# Patient Record
Sex: FEMALE | Race: BLACK OR AFRICAN AMERICAN | Employment: FULL TIME | URBAN - METROPOLITAN AREA
[De-identification: names, ages, dates, MRNs, and addresses within clinical notes are randomized per-mention and may not be internally consistent; named-entity substitution may affect disease eponyms.]

---

## 2018-03-20 ENCOUNTER — OFFICE VISIT (OUTPATIENT)
Dept: MIDWIFE SERVICES | Age: 33
End: 2018-03-20

## 2018-03-20 VITALS
HEIGHT: 61 IN | WEIGHT: 145 LBS | HEART RATE: 65 BPM | SYSTOLIC BLOOD PRESSURE: 97 MMHG | BODY MASS INDEX: 27.38 KG/M2 | DIASTOLIC BLOOD PRESSURE: 62 MMHG

## 2018-03-20 DIAGNOSIS — Z20.2 POSSIBLE EXPOSURE TO STD: ICD-10-CM

## 2018-03-20 DIAGNOSIS — Z12.4 SCREENING FOR CERVICAL CANCER: ICD-10-CM

## 2018-03-20 DIAGNOSIS — Z01.419 WELL WOMAN EXAM WITH ROUTINE GYNECOLOGICAL EXAM: Primary | ICD-10-CM

## 2018-03-20 NOTE — PROGRESS NOTES
Chief Complaint   Patient presents with    Well Woman     Visit Vitals    BP 97/62    Pulse 65    Ht 5' 1\" (1.549 m)    Wt 145 lb (65.8 kg)    LMP 03/05/2018 (Exact Date)    BMI 27.4 kg/m2     1. Have you been to the ER, urgent care clinic since your last visit? Hospitalized since your last visit? No    2. Have you seen or consulted any other health care providers outside of the [address] [place] [address] since your last visit? Include any pap smears or colon screening. No     Patient is here today for a well woman exam.  Her last pap was 6/2015 and it was ASCUS with -HPV. She has complaints of nothing today.      verbal order for pap and nuswab for std's per concetta webster cnm

## 2018-03-20 NOTE — MR AVS SNAPSHOT
[address] [address] 1007 [facility] 
[phone] Patient: Linh Pinedo MRN: [id number] VCP:[id number] Visit Information Date & Time Provider Department Dept. Phone Encounter #  
 3/20/2018  1:30 PM [facility] [address] [id number] Your Appointments 4/11/2018 10:30 AM  
PHYSICAL with Primus Simon, [address] [place] [address],[address]) Appt Note: CPE//Yearly Physical w/fasting labs [address] [id number]  
[address] 235 [facility]  [address] Upcoming Health Maintenance Date Due Influenza Age 5 to Adult 8/1/2017 PAP AKA CERVICAL CYTOLOGY 6/3/2018 Allergies as of 3/20/2018  Review Complete On: 3/20/2018 By: Jevon Rothman RN No Known Allergies Current Immunizations  Reviewed on 4/17/2015 No immunizations on file. Not reviewed this visit You Were Diagnosed With   
  
 Codes Comments Screening for cervical cancer    -  Primary ICD-10-CM: Z12.4 ICD-9-CM: V76.2 Exposure to STD     ICD-10-CM: Z20.2 ICD-9-CM: V01.6 Vitals BP Pulse Height(growth percentile) Weight(growth percentile) LMP BMI  
 97/62 65 5' 1\" (1.549 m) 145 lb (65.8 kg) 03/05/2018 (Exact Date) 27.4 kg/m2 OB Status Smoking Status Having regular periods Never Smoker BMI and BSA Data Body Mass Index Body Surface Area  
 27.4 kg/m 2 1.68 m 2 Preferred Pharmacy Pharmacy Name Phone [id number] [phone] Your Updated Medication List  
  
   
This list is accurate as of 3/20/18  2:58 PM.  Always use your most recent med list.  
  
  
  
  
 ALPHA LIPOIC ACID PO Take  by mouth.  
  
 b complex vitamins tablet Take 1 Tab by mouth daily. BIOTIN PO Take  by mouth.  
  
 calcium-magnesium-zinc Tab Take  by mouth. FISH OIL 1,000 mg Cap Generic drug:  omega-3 fatty acids-vitamin e Take 1 Cap by mouth. HAIR,SKIN & NAILS PO Take  by mouth. ibuprofen 800 mg tablet Commonly known as:  MOTRIN Take 1 Tab by mouth every eight (8) hours. VITAMIN C PO Take  by mouth. VITAMIN D3 2,000 unit Tab Generic drug:  cholecalciferol (vitamin D3) Take  by mouth. We Performed the Following [address] [id number] Custom] PAP (IMAGE GUIDED) + HPV HIGH RISK [[id number] Custom] Patient Instructions Thank you for choosing [facility]. We know you have many options when it comes to your healthcare; we appreciate that you picked us. Our goal is to provide exceptional service and world class care for every patient. You may receive a survey in the mail or by email asking for your feedback. Please take a few minutes to share your thoughts about your recent visit. Your comments helps us understand what we do well and what we can do better. To ensure confidentiality, this survey is administered by an independent third-party, [employer] [place] participation will help us to continue and improve the quality of care that we provide to you, your family, friends, and neighbors. Thank you! Well Visit, Ages 25 to 48: Care Instructions Your Care Instructions Physical exams can help you stay healthy. Your doctor has checked your overall health and may have suggested ways to take good care of yourself. He or she also may have recommended tests. At home, you can help prevent illness with healthy eating, regular exercise, and other steps. Follow-up care is a key part of your treatment and safety.  Be sure to make and go to all appointments, and call your doctor if you are having problems. It's also a good idea to know your test results and keep a list of the medicines you take. How can you care for yourself at home? · Reach and stay at a healthy weight. This will lower your risk for many problems, such as obesity, diabetes, heart disease, and high blood pressure. · Get at least 30 minutes of physical activity on most days of the week. Walking is a good choice. You also may want to do other activities, such as running, swimming, cycling, or playing tennis or team sports. Discuss any changes in your exercise program with your doctor. · Do not smoke or allow others to smoke around you. If you need help quitting, talk to your doctor about stop-smoking programs and medicines. These can increase your chances of quitting for good. · Talk to your doctor about whether you have any risk factors for sexually transmitted infections (STIs). Having one sex partner (who does not have STIs and does not have sex with anyone else) is a good way to avoid these infections. · Use birth control if you do not want to have children at this time. Talk with your doctor about the choices available and what might be best for you. · Protect your skin from too much sun. When you're outdoors from 10 a.m. to 4 p.m., stay in the shade or cover up with clothing and a hat with a wide brim. Wear sunglasses that block UV rays. Even when it's cloudy, put broad-spectrum sunscreen (SPF 30 or higher) on any exposed skin. · See a dentist one or two times a year for checkups and to have your teeth cleaned. · Wear a seat belt in the car. · Drink alcohol in moderation, if at all. That means no more than 2 drinks a day for men and 1 drink a day for women. Follow your doctor's advice about when to have certain tests. These tests can spot problems early. For everyone · Cholesterol. Have the fat (cholesterol) in your blood tested after age 21.  Your doctor will tell you how often to have this done based on your age, family history, or other things that can increase your risk for heart disease. · Blood pressure. Have your blood pressure checked during a routine doctor visit. Your doctor will tell you how often to check your blood pressure based on your age, your blood pressure results, and other factors. · Vision. Talk with your doctor about how often to have a glaucoma test. 
· Diabetes. Ask your doctor whether you should have tests for diabetes. · Colon cancer. Have a test for colon cancer at age 48. You may have one of several tests. If you are younger than 48, you may need a test earlier if you have any risk factors. Risk factors include whether you already had a precancerous polyp removed from your colon or whether your parent, brother, sister, or child has had colon cancer. For women · Breast exam and mammogram. Talk to your doctor about when you should have a clinical breast exam and a mammogram. Medical experts differ on whether and how often women under 50 should have these tests. Your doctor can help you decide what is right for you. · Pap test and pelvic exam. Begin Pap tests at age 24. A Pap test is the best way to find cervical cancer. The test often is part of a pelvic exam. Ask how often to have this test. 
· Tests for sexually transmitted infections (STIs). Ask whether you should have tests for STIs. You may be at risk if you have sex with more than one person, especially if your partners do not wear condoms. For men · Tests for sexually transmitted infections (STIs). Ask whether you should have tests for STIs. You may be at risk if you have sex with more than one person, especially if you do not wear a condom. · Testicular cancer exam. Ask your doctor whether you should check your testicles regularly. · Prostate exam. Talk to your doctor about whether you should have a blood test (called a PSA test) for prostate cancer.  Experts differ on whether and when men should have this test. Some experts suggest it if you are older than 39 and are -American or have a father or brother who got prostate cancer when he was younger than 72. When should you call for help? Watch closely for changes in your health, and be sure to contact your doctor if you have any problems or symptoms that concern you. Where can you learn more? Go to http://dylon-ambar.info/. Enter P072 in the search box to learn more about \"Well Visit, Ages 25 to 48: Care Instructions. \" Current as of: May 12, 2017 Content Version: 11.4 © 6011-2609 [employer]. Care instructions adapted under license by [employer] (which disclaims liability or warranty for this information). If you have questions about a medical condition or this instruction, always ask your healthcare professional. [address] any warranty or liability for your use of this information. Introducing [facility]! Dear Homer Colon: Thank you for requesting a [employer] account. Our records indicate that you already have an active [employer] account. You can access your account anytime at [email or web address] Did you know that you can access your hospital and ER discharge instructions at any time in [employer]? You can also review all of your test results from your hospital stay or ER visit. Additional Information If you have questions, please visit the Frequently Asked Questions section of the [employer] website at [email or web address] Remember, [employer] is NOT to be used for urgent needs. For medical emergencies, dial 911. Now available from your iPhone and Android! Please provide this summary of care documentation to your next provider. Your primary care clinician is listed as Antolin Young. If you have any questions after today's visit, please call [phone].

## 2018-03-20 NOTE — PATIENT INSTRUCTIONS
Thank you for choosing [address]. We know you have many options when it comes to your healthcare; we appreciate that you picked us. Our goal is to provide exceptional service and world class care for every patient. You may receive a survey in the mail or by email asking for your feedback. Please take a few minutes to share your thoughts about your recent visit. Your comments helps us understand what we do well and what we can do better. To ensure confidentiality, this survey is administered by an independent third-party, [employer] participation will help us to continue and improve the quality of care that we provide to you, your family, friends, and neighbors. Thank you! Well Visit, Ages 25 to 48: Care Instructions  Your Care Instructions    Physical exams can help you stay healthy. Your doctor has checked your overall health and may have suggested ways to take good care of yourself. He or she also may have recommended tests. At home, you can help prevent illness with healthy eating, regular exercise, and other steps. Follow-up care is a key part of your treatment and safety. Be sure to make and go to all appointments, and call your doctor if you are having problems. It's also a good idea to know your test results and keep a list of the medicines you take. How can you care for yourself at home? · Reach and stay at a healthy weight. This will lower your risk for many problems, such as obesity, diabetes, heart disease, and high blood pressure. · Get at least 30 minutes of physical activity on most days of the week. Walking is a good choice. You also may want to do other activities, such as running, swimming, cycling, or playing tennis or team sports. Discuss any changes in your exercise program with your doctor. · Do not smoke or allow others to smoke around you. If you need help quitting, talk to your doctor about stop-smoking programs and medicines. These can increase your chances of quitting for good. · Talk to your doctor about whether you have any risk factors for sexually transmitted infections (STIs). Having one sex partner (who does not have STIs and does not have sex with anyone else) is a good way to avoid these infections. · Use birth control if you do not want to have children at this time. Talk with your doctor about the choices available and what might be best for you. · Protect your skin from too much sun. When you're outdoors from 10 a.m. to 4 p.m., stay in the shade or cover up with clothing and a hat with a wide brim. Wear sunglasses that block UV rays. Even when it's cloudy, put broad-spectrum sunscreen (SPF 30 or higher) on any exposed skin. · See a dentist one or two times a year for checkups and to have your teeth cleaned. · Wear a seat belt in the car. · Drink alcohol in moderation, if at all. That means no more than 2 drinks a day for men and 1 drink a day for women. Follow your doctor's advice about when to have certain tests. These tests can spot problems early. For everyone  · Cholesterol. Have the fat (cholesterol) in your blood tested after age 21. Your doctor will tell you how often to have this done based on your age, family history, or other things that can increase your risk for heart disease. · Blood pressure. Have your blood pressure checked during a routine doctor visit. Your doctor will tell you how often to check your blood pressure based on your age, your blood pressure results, and other factors. · Vision. Talk with your doctor about how often to have a glaucoma test.  · Diabetes. Ask your doctor whether you should have tests for diabetes. · Colon cancer. Have a test for colon cancer at age 48. You may have one of several tests. If you are younger than 48, you may need a test earlier if you have any risk factors.  Risk factors include whether you already had a precancerous polyp removed from your colon or whether your parent, brother, sister, or child has had colon cancer. For women  · Breast exam and mammogram. Talk to your doctor about when you should have a clinical breast exam and a mammogram. Medical experts differ on whether and how often women under 50 should have these tests. Your doctor can help you decide what is right for you. · Pap test and pelvic exam. Begin Pap tests at age 24. A Pap test is the best way to find cervical cancer. The test often is part of a pelvic exam. Ask how often to have this test.  · Tests for sexually transmitted infections (STIs). Ask whether you should have tests for STIs. You may be at risk if you have sex with more than one person, especially if your partners do not wear condoms. For men  · Tests for sexually transmitted infections (STIs). Ask whether you should have tests for STIs. You may be at risk if you have sex with more than one person, especially if you do not wear a condom. · Testicular cancer exam. Ask your doctor whether you should check your testicles regularly. · Prostate exam. Talk to your doctor about whether you should have a blood test (called a PSA test) for prostate cancer. Experts differ on whether and when men should have this test. Some experts suggest it if you are older than 39 and are -American or have a father or brother who got prostate cancer when he was younger than 72. When should you call for help? Watch closely for changes in your health, and be sure to contact your doctor if you have any problems or symptoms that concern you. Where can you learn more? Go to http://dylon-ambar.info/. Enter P072 in the search box to learn more about \"Well Visit, Ages 25 to 48: Care Instructions. \"  Current as of: May 12, 2017  Content Version: 11.4  © 1210-5988 [employer]. Care instructions adapted under license by [employer] (which disclaims liability or warranty for this information).  If you have questions about a medical condition or this instruction, always ask your healthcare professional. [address] [address] any warranty or liability for your use of this information.

## 2018-03-20 NOTE — PROGRESS NOTES
Kamar New is a [place] ,  27 y.o. female who presents for her annual checkup. Patient's last menstrual period was 03/05/2018 (exact date). .  She is not currently sexually active, but would like to discuss birth control. She was  about a year ago, and has been in a couple of relationships since then. She is struggling with dating and often feels rejected. She was tearful through most of the appointment. She denies depression, and does not want treatment. She would like STD testing. She has an appointment with her PCP next month and would like to have any blood work done there. Last pelvic/PAP: 06/2015 ASCUS, neg HPV        Current Outpatient Prescriptions   Medication Sig Dispense Refill    ASCORBATE CALCIUM (VITAMIN C PO) Take  by mouth.  ALPHA LIPOIC ACID PO Take  by mouth.  BIOTIN PO Take  by mouth.  omega-3 fatty acids-vitamin e (FISH OIL) 1,000 mg cap Take 1 Cap by mouth.  MULTIVITAMIN WITH MINERALS (HAIR,SKIN & NAILS PO) Take  by mouth.  b complex vitamins tablet Take 1 Tab by mouth daily.  cholecalciferol, vitamin D3, (VITAMIN D3) 2,000 unit Tab Take  by mouth.  calcium-magnesium-zinc Tab Take  by mouth.  ibuprofen (MOTRIN) 800 mg tablet Take 1 Tab by mouth every eight (8) hours. 30 Tab 0     Allergies: Review of patient's allergies indicates no known allergies.    Social History     Social History    Marital status:      Spouse name: N/A    Number of children: 3    Years of education: N/A     Occupational History    human resources      Social History Main Topics    Smoking status: Never Smoker    Smokeless tobacco: Never Used    Alcohol use No    Drug use: No    Sexual activity: Yes     Partners: Male     Birth control/ protection: None     Other Topics Concern    Not on file     Social History Narrative     Family History   Problem Relation Age of Onset    Hypertension Mother    Delona Face Other Mother      Respiratory complications (on oxygen)    Elevated Lipids Father     Diabetes Father     No Known Problems Brother     Kidney Disease Maternal Grandmother     Cancer Maternal Grandfather      prostate    Dementia Paternal Grandmother     Stroke Paternal Grandfather     No Known Problems Brother      Patient Active Problem List   Diagnosis Code   (none) - all problems resolved or deleted       Review of Systems - History obtained from the patient  Constitutional: negative for weight loss, fever, night sweats  HEENT: negative for hearing loss, earache, congestion, snoring, sorethroat  CV: negative for chest pain, palpitations, edema  Resp: negative for cough, shortness of breath, wheezing  GI: negative for change in bowel habits, abdominal pain, black or bloody stools  : negative for frequency, dysuria, hematuria, vaginal discharge  MSK: negative for back pain, joint pain, muscle pain  Breast: negative for breast lumps, nipple discharge, galactorrhea  Skin:negative for itching, rash, hives  Neuro: negative for dizziness, headache, confusion, weakness  Psych: negative for anxiety, depression, change in mood  Heme/lymph: negative for bleeding, bruising, pallor    Physical Exam    Visit Vitals    BP 97/62    Pulse 65    Ht 5' 1\" (1.549 m)    Wt 145 lb (65.8 kg)    LMP 03/05/2018 (Exact Date)    BMI 27.4 kg/m2     General appearance: alert, cooperative, no distress, appears stated age  Neck: supple, symmetrical, trachea midline, no adenopathy, thyroid: not enlarged, symmetric, no tenderness/mass/nodules, no carotid bruit and no JVD  Back: symmetric, no curvature. ROM normal.   Lungs: clear to auscultation bilaterally, no wheezes, no increased work of breathing  Heart: regular rate and rhythm, S1, S2 normal, no murmur, click, rub or gallop  Abdomen: soft, non-tender.  Bowel sounds normal. No masses,  no organomegaly  Extremities: extremities normal, atraumatic, no cyanosis or edema  Skin: Skin color, texture, turgor normal. No rashes or lesions  Pelvic: ext genital nl without rashes or lesions, pink and moist vaginal mucosa, scant white discharge, cervix without lesions or abnormal discharge, uterus non tender and normal size, no adnexal masses or tenderness  Breast: non tender, no masses or tenderness, no nipple discharge      Assessment and Plan:    1.  Well woman - physical / health maintenance visit   Pap smear   Counseled re: diet, exercise, healthy lifestyle               Referred to [facility]               Nuswab   Return for yearly wellness visits

## 2018-03-26 LAB
A VAGINAE DNA VAG QL NAA+PROBE: NORMAL SCORE
BVAB2 DNA VAG QL NAA+PROBE: NORMAL SCORE
C ALBICANS DNA VAG QL NAA+PROBE: NEGATIVE
C GLABRATA DNA VAG QL NAA+PROBE: NEGATIVE
C TRACH RRNA SPEC QL NAA+PROBE: NEGATIVE
MEGA1 DNA VAG QL NAA+PROBE: NORMAL SCORE
N GONORRHOEA RRNA SPEC QL NAA+PROBE: NEGATIVE
T VAGINALIS RRNA SPEC QL NAA+PROBE: NEGATIVE

## 2018-03-27 LAB
CYTOLOGIST CVX/VAG CYTO: NORMAL
CYTOLOGY CVX/VAG DOC THIN PREP: NORMAL
DX ICD CODE: NORMAL
HPV I/H RISK 1 DNA CVX QL PROBE+SIG AMP: NORMAL
HPV I/H RISK 4 DNA CVX QL PROBE+SIG AMP: NEGATIVE
Lab: NORMAL
OTHER STN SPEC: NORMAL
PATH REPORT.FINAL DX SPEC: NORMAL
STAT OF ADQ CVX/VAG CYTO-IMP: NORMAL

## 2018-04-11 ENCOUNTER — OFFICE VISIT (OUTPATIENT)
Dept: INTERNAL MEDICINE CLINIC | Age: 33
End: 2018-04-11

## 2018-04-11 VITALS
DIASTOLIC BLOOD PRESSURE: 74 MMHG | BODY MASS INDEX: 27.75 KG/M2 | HEART RATE: 75 BPM | OXYGEN SATURATION: 98 % | TEMPERATURE: 98.1 F | HEIGHT: 61 IN | WEIGHT: 147 LBS | RESPIRATION RATE: 16 BRPM | SYSTOLIC BLOOD PRESSURE: 112 MMHG

## 2018-04-11 DIAGNOSIS — Z00.00 PHYSICAL EXAM, ANNUAL: Primary | ICD-10-CM

## 2018-04-11 NOTE — PROGRESS NOTES
HISTORY OF PRESENT ILLNESS  Fallon Andino is a 27 y.o. female. HPI   Last here 4/17/15. Pt is here for routine care. BP is 112/74    Wt is down 3 lbs x lov  She states that her home scale reads differently than ours  She exercises 5-6x weekly  She does not drink caloric beverage   Discussed decreasing caloric beverage and increasing water intake    Advised her to prepare healthy meals    Reviewed labs 4/15  Will get labs today     PREVENTIVE:  Colonoscopy: not yet needed, due at age 37  Pap: Dr. Maryuri Hebert, 3/18  Mammogram: not yet needed, due at age 34  Dexa: not yet needed, due at age 70  Tdap:   Pneumovax: not yet needed, due at age 70  Zostavax: not yet needed, due at age 70  Flu shot: declines  Eye exam: , due  Lipids: 4/15 LDL 87    There are no active problems to display for this patient. Current Outpatient Prescriptions   Medication Sig Dispense Refill    ASCORBATE CALCIUM (VITAMIN C PO) Take  by mouth.  ALPHA LIPOIC ACID PO Take  by mouth.  BIOTIN PO Take  by mouth.  omega-3 fatty acids-vitamin e (FISH OIL) 1,000 mg cap Take 1 Cap by mouth.  MULTIVITAMIN WITH MINERALS (HAIR,SKIN & NAILS PO) Take  by mouth.  b complex vitamins tablet Take 1 Tab by mouth daily.  ibuprofen (MOTRIN) 800 mg tablet Take 1 Tab by mouth every eight (8) hours. 30 Tab 0    cholecalciferol, vitamin D3, (VITAMIN D3) 2,000 unit Tab Take  by mouth.  calcium-magnesium-zinc Tab Take  by mouth.        Past Surgical History:   Procedure Laterality Date    HX  SECTION      HX GYN          HX OTHER SURGICAL      wisdom teeth extraction    HX OTHER SURGICAL      cyst removed from R hand      Lab Results  Component Value Date/Time   WBC 4.6 2015 03:15 PM   HGB 13.4 2015 03:15 PM   HCT 40.8 2015 03:15 PM   PLATELET 595  03:15 PM   MCV 93 2015 03:15 PM   Hgb, External 11.9 g/dL 2013   Hct, External 35.4 %  2013   Platelet cnt., External 184 x10E3/uL 05/11/2013     Lab Results  Component Value Date/Time   Cholesterol, total 192 04/17/2015 03:15 PM   HDL Cholesterol 92 04/17/2015 03:15 PM   LDL, calculated 87 04/17/2015 03:15 PM   Triglyceride 67 04/17/2015 03:15 PM     Lab Results  Component Value Date/Time   GFR est non-AA 97 04/17/2015 03:15 PM   GFR est  04/17/2015 03:15 PM   Creatinine 0.81 04/17/2015 03:15 PM   BUN 11 04/17/2015 03:15 PM   Sodium 143 04/17/2015 03:15 PM   Potassium 4.4 04/17/2015 03:15 PM   Chloride 100 04/17/2015 03:15 PM   CO2 27 04/17/2015 03:15 PM        Review of Systems   Respiratory: Negative for shortness of breath. Cardiovascular: Negative for chest pain. Physical Exam   Constitutional: She is oriented to person, place, and time. She appears well-developed and well-nourished. No distress. HENT:   Head: Normocephalic and atraumatic. Right Ear: External ear normal.   Left Ear: External ear normal.   Mouth/Throat: Oropharynx is clear and moist. No oropharyngeal exudate. Mild cerumen L TM   Eyes: Conjunctivae and EOM are normal. Right eye exhibits no discharge. Left eye exhibits no discharge. No scleral icterus. Neck: Normal range of motion. Neck supple. No carotid bruits    Cardiovascular: Normal rate, regular rhythm, normal heart sounds and intact distal pulses. Exam reveals no gallop and no friction rub. No murmur heard. Pulmonary/Chest: Effort normal and breath sounds normal. No respiratory distress. She has no wheezes. She has no rales. She exhibits no tenderness. Abdominal: Soft. She exhibits no distension and no mass. There is no tenderness. There is no rebound and no guarding. Musculoskeletal: Normal range of motion. She exhibits no edema, tenderness or deformity. Lymphadenopathy:     She has no cervical adenopathy. Neurological: She is alert and oriented to person, place, and time. Coordination normal.   Skin: Skin is warm and dry. No rash noted.  She is not diaphoretic. No erythema. No pallor. Psychiatric: She has a normal mood and affect. Her behavior is normal.       ASSESSMENT and PLAN    ICD-10-CM ICD-9-CM    1. Physical exam, annual    Discussed diet and w/l and exercise, nl BP, labs today, schedule eye exam, declines flu shot, Tdap and pelvic UTD   Z00.00 V70.0 LIPID PANEL      METABOLIC PANEL, COMPREHENSIVE      CBC W/O DIFF      TSH 3RD GENERATION      HEMOGLOBIN A1C WITH EAG      VITAMIN D, 25 HYDROXY        Depression screen reviewed and negative. Scribed by Scotty Dose of [address], as dictated by Dr. Maddy Landers. Current diagnosis and concerns discussed with pt at length. Pt understands risks and benefits or current treatment plan and medications, and accepts the treatment and medication with any possible risks. Pt asks appropriate questions, which were answered. Pt was instructed to call with any concerns or problems. This note will not be viewable in [address]

## 2018-04-11 NOTE — MR AVS SNAPSHOT
[address] [facility] [address] [facility] [address] [place] [address] 
[phone] Patient: Cathryn Rosa MRN: [id number] NJS:[id number] Visit Information Date & Time Provider Department Dept. Phone Encounter #  
 4/11/2018 10:30 AM Jacinta Hanson, Shun [address] [place] [address],[address] [phone] [id number] Follow-up Instructions Return in about 1 year (around 4/11/2019). Upcoming Health Maintenance Date Due DTaP/Tdap/Td series (1 - Tdap) 8/25/2004 PAP AKA CERVICAL CYTOLOGY 3/20/2021 Allergies as of 4/11/2018  Review Complete On: 4/11/2018 By: Jacinta Hanson MD  
 No Known Allergies Current Immunizations  Reviewed on 4/17/2015 Name Date Tdap 1/1/2013 Not reviewed this visit You Were Diagnosed With   
  
 Codes Comments Physical exam, annual    -  Primary ICD-10-CM: Z00.00 ICD-9-CM: V70.0 Vitals BP Pulse Temp Resp Height(growth percentile) Weight(growth percentile) 112/74 (BP 1 Location: Left arm, BP Patient Position: Sitting) 75 98.1 °F (36.7 °C) (Oral) 16 5' 1\" (1.549 m) 147 lb (66.7 kg) SpO2 BMI OB Status Smoking Status 98% 27.78 kg/m2 Having regular periods Never Smoker Vitals History BMI and BSA Data Body Mass Index Body Surface Area  
 27.78 kg/m 2 1.69 m 2 Preferred Pharmacy Pharmacy Name Phone [id number] [phone] Your Updated Medication List  
  
   
This list is accurate as of 4/11/18 11:00 AM.  Always use your most recent med list.  
  
  
  
  
 ALPHA LIPOIC ACID PO Take  by mouth.  
  
 b complex vitamins tablet Take 1 Tab by mouth daily. BIOTIN PO Take  by mouth.  
  
 calcium-magnesium-zinc Tab Take  by mouth. FISH OIL 1,000 mg Cap Generic drug:  omega-3 fatty acids-vitamin e Take 1 Cap by mouth.   
  
 HAIR,SKIN & NAILS PO  
 Take  by mouth. ibuprofen 800 mg tablet Commonly known as:  MOTRIN Take 1 Tab by mouth every eight (8) hours. VITAMIN C PO Take  by mouth. VITAMIN D3 2,000 unit Tab Generic drug:  cholecalciferol (vitamin D3) Take  by mouth. We Performed the Following CBC W/O DIFF [28938 CPT(R)] HEMOGLOBIN A1C WITH EAG [60161 CPT(R)] LIPID PANEL [89822 CPT(R)] METABOLIC PANEL, COMPREHENSIVE [47718 CPT(R)] TSH 3RD GENERATION [84774 CPT(R)] VITAMIN D, 25 HYDROXY M6751954 CPT(R)] Follow-up Instructions Return in about 1 year (around 4/11/2019). Introducing [facility] & HEALTH SERVICES! Dear Criss Hdez: Thank you for requesting a [employer] account. Our records indicate that you already have an active [employer] account. You can access your account anytime at [email or web address] Did you know that you can access your hospital and ER discharge instructions at any time in Oz Sonotek? You can also review all of your test results from your hospital stay or ER visit. Additional Information If you have questions, please visit the Frequently Asked Questions section of the [employer] website at [email or web address] Remember, [employer] Sonotek is NOT to be used for urgent needs. For medical emergencies, dial 911. Now available from your iPhone and Android! Please provide this summary of care documentation to your next provider. Your primary care clinician is listed as Olivia Alarcon. If you have any questions after today's visit, please call [phone].

## 2018-04-12 LAB
25(OH)D3+25(OH)D2 SERPL-MCNC: 31.9 NG/ML (ref 30–100)
ALBUMIN SERPL-MCNC: 4.3 G/DL (ref 3.5–5.5)
ALBUMIN/GLOB SERPL: 1.8 {RATIO} (ref 1.2–2.2)
ALP SERPL-CCNC: 40 IU/L (ref 39–117)
ALT SERPL-CCNC: 11 IU/L (ref 0–32)
AST SERPL-CCNC: 22 IU/L (ref 0–40)
BILIRUB SERPL-MCNC: 0.3 MG/DL (ref 0–1.2)
BUN SERPL-MCNC: 10 MG/DL (ref 6–20)
BUN/CREAT SERPL: 12 (ref 9–23)
CALCIUM SERPL-MCNC: 9.3 MG/DL (ref 8.7–10.2)
CHLORIDE SERPL-SCNC: 97 MMOL/L (ref 96–106)
CHOLEST SERPL-MCNC: 189 MG/DL (ref 100–199)
CO2 SERPL-SCNC: 26 MMOL/L (ref 18–29)
CREAT SERPL-MCNC: 0.83 MG/DL (ref 0.57–1)
ERYTHROCYTE [DISTWIDTH] IN BLOOD BY AUTOMATED COUNT: 15 % (ref 12.3–15.4)
EST. AVERAGE GLUCOSE BLD GHB EST-MCNC: 100 MG/DL
GFR SERPLBLD CREATININE-BSD FMLA CKD-EPI: 106 ML/MIN/1.73
GFR SERPLBLD CREATININE-BSD FMLA CKD-EPI: 92 ML/MIN/1.73
GLOBULIN SER CALC-MCNC: 2.4 G/DL (ref 1.5–4.5)
GLUCOSE SERPL-MCNC: 74 MG/DL (ref 65–99)
HBA1C MFR BLD: 5.1 % (ref 4.8–5.6)
HCT VFR BLD AUTO: 37.8 % (ref 34–46.6)
HDLC SERPL-MCNC: 91 MG/DL
HGB BLD-MCNC: 12.9 G/DL (ref 11.1–15.9)
LDLC SERPL CALC-MCNC: 88 MG/DL (ref 0–99)
MCH RBC QN AUTO: 32.1 PG (ref 26.6–33)
MCHC RBC AUTO-ENTMCNC: 34.1 G/DL (ref 31.5–35.7)
MCV RBC AUTO: 94 FL (ref 79–97)
PLATELET # BLD AUTO: 245 X10E3/UL (ref 150–379)
POTASSIUM SERPL-SCNC: 4.3 MMOL/L (ref 3.5–5.2)
PROT SERPL-MCNC: 6.7 G/DL (ref 6–8.5)
RBC # BLD AUTO: 4.02 X10E6/UL (ref 3.77–5.28)
SODIUM SERPL-SCNC: 137 MMOL/L (ref 134–144)
TRIGL SERPL-MCNC: 52 MG/DL (ref 0–149)
TSH SERPL DL<=0.005 MIU/L-ACNC: 1.34 UIU/ML (ref 0.45–4.5)
VLDLC SERPL CALC-MCNC: 10 MG/DL (ref 5–40)
WBC # BLD AUTO: 4.6 X10E3/UL (ref 3.4–10.8)

## 2018-06-12 ENCOUNTER — OFFICE VISIT (OUTPATIENT)
Dept: MIDWIFE SERVICES | Age: 33
End: 2018-06-12

## 2018-06-12 VITALS
WEIGHT: 153.5 LBS | HEART RATE: 74 BPM | SYSTOLIC BLOOD PRESSURE: 109 MMHG | DIASTOLIC BLOOD PRESSURE: 63 MMHG | BODY MASS INDEX: 28.98 KG/M2 | HEIGHT: 61 IN

## 2018-06-12 DIAGNOSIS — A74.9 CHLAMYDIA: Primary | ICD-10-CM

## 2018-06-12 NOTE — PROGRESS NOTES
Guillermo Mena presents wanting to discuss birth control. She has had a couple of new partners this year. She recently had a pregnancy scare and decided she wanted to start on birth control. May 20th she was seen at a clinic and treated for chlamydia. Her partner was informed and treated also. She had sex on the first day of treatment. She states she now is going to reevaluate her position with men and has learned a lesson the hard way. High risk sexual behavior    Birth control methods reviewed. Guillermo Mena would like to have a Mirena. Form for pre approval given. Call to make another appointment for insertion after approval.  Risks and benefits reviewed. Lucrecia did not call [facility] at [facility] after her annual in March. Number given again and encouraged her to call.     Urine sent for test of cure for Chlamydia/GT

## 2018-06-12 NOTE — PROGRESS NOTES
Chief Complaint   Patient presents with   [facility] Advice Only     pt would not elaborate to me at this time. Visit Vitals    /63    Pulse 74    Ht 5' 1\" (1.549 m)    Wt 153 lb 8 oz (69.6 kg)    LMP 05/20/2018 (Exact Date)    BMI 29 kg/m2       1. Have you been to the ER, urgent care clinic since your last visit? Hospitalized since your last visit? No    2. Have you seen or consulted any other health care providers outside of the [facility] since your last visit? Include any pap smears or colon screening. No     Here today to talk about her birth control and \"some other things\". She would not elaborate on those other things. She had her annual in march or this year.

## 2018-06-14 LAB
C TRACH RRNA SPEC QL NAA+PROBE: NEGATIVE
N GONORRHOEA RRNA SPEC QL NAA+PROBE: NEGATIVE

## 2018-06-25 ENCOUNTER — OFFICE VISIT (OUTPATIENT)
Dept: MIDWIFE SERVICES | Age: 33
End: 2018-06-25

## 2018-06-25 VITALS
DIASTOLIC BLOOD PRESSURE: 77 MMHG | HEIGHT: 61 IN | BODY MASS INDEX: 29.27 KG/M2 | HEART RATE: 88 BPM | SYSTOLIC BLOOD PRESSURE: 120 MMHG | WEIGHT: 155 LBS

## 2018-06-25 DIAGNOSIS — Z72.51 HIGH RISK SEXUAL BEHAVIOR: Primary | ICD-10-CM

## 2018-06-25 NOTE — MR AVS SNAPSHOT
[address] [place] [address] [address] [place] [phone] Patient: Mandy Mckeon MRN: [id number] CKI:6[birth year]508 Visit Information Date & Time Provider Department Dept. Phone Encounter #  
 6/25/2018  3:30 PM Jodi Brewer CNM [facility] [address] [id number] Your Appointments 4/12/2019 10:30 AM  
PHYSICAL with Yarelis Morales, [address] [place] [address] [facility] Appt Note: 1 year CPE  
 [facility] [address] [address]  
[address]  [address] [place] [address] Upcoming Health Maintenance Date Due Influenza Age 5 to Adult 8/1/2018 PAP AKA CERVICAL CYTOLOGY 3/20/2021 Allergies as of 6/25/2018  Review Complete On: 6/12/2018 By: Jodi Brewer CNM No Known Allergies Current Immunizations  Reviewed on 4/17/2015 Name Date Tdap 1/1/2013 Not reviewed this visit Vitals OB Status Smoking Status Having regular periods Never Smoker Your Updated Medication List  
  
   
This list is accurate as of 6/25/18  3:50 PM.  Always use your most recent med list.  
  
  
  
  
 ALPHA LIPOIC ACID PO Take  by mouth.  
  
 b complex vitamins tablet Take 1 Tab by mouth daily. BIOTIN PO Take  by mouth.  
  
 calcium-magnesium-zinc Tab Take  by mouth. FISH OIL 1,000 mg Cap Generic drug:  omega-3 fatty acids-vitamin e Take 1 Cap by mouth. HAIR,SKIN & NAILS PO Take  by mouth. ibuprofen 800 mg tablet Commonly known as:  MOTRIN Take 1 Tab by mouth every eight (8) hours. VITAMIN C PO Take  by mouth. VITAMIN D3 2,000 unit Tab Generic drug:  cholecalciferol (vitamin D3) Take  by mouth. Introducing [facility] & HEALTH SERVICES! Dear Wanda Serrato: Thank you for requesting a Libboo account.   Our records indicate that you already have an active [employer] account. You can access your account anytime at [email or web address] Did you know that you can access your hospital and ER discharge instructions at any time in [employer]? You can also review all of your test results from your hospital stay or ER visit. Additional Information If you have questions, please visit the Frequently Asked Questions section of the [employer] website at [email or web address] Remember, [employer] is NOT to be used for urgent needs. For medical emergencies, dial 911. Now available from your iPhone and Android! Please provide this summary of care documentation to your next provider. Your primary care clinician is listed as Valentin Hamilton. If you have any questions after today's visit, please call [phone].

## 2018-06-25 NOTE — PROGRESS NOTES
Christal Peter is a  that presents with complaints of post coital bleeding. She believes it is from her cervix. There is just a little spotting on the tissue. The last time it happened was about 2 weeks ago. It has happened at least 2 times. The last few times after sex she has not got up to check for bleeding. Christal Peter was recently treated for chlamydia and had a positive test of cure on 18. She had a normal pap in March. Christal Peter would like to have a Mirena inserted as soon as possible as her insurance expires on . Christal Peter is now using condoms every time. PHYSICAL EXAMINATION    Constitutional  · Appearance: well-nourished, well developed, alert, in no acute distress      Genitourinary  · External Genitalia: normal appearance for age, no discharge present, no tenderness present, no inflammatory lesions present, no masses present, no atrophy present  · Vagina: normal vaginal vault without central or paravaginal defects, no discharge present, no inflammatory lesions present, no masses present  · Bladder: non-tender to palpation  · Urethra: appears normal  · Cervix: normal, vascular   · Uterus: normal size, shape and consistency  · Adnexa: no adnexal tenderness present, no adnexal masses present  · Perineum: perineum within normal limits, no evidence of trauma, no rashes or skin lesions present  · Anus: anus within normal limits, no hemorrhoids present  · Inguinal Lymph Nodes: no lymphadenopathy present      ASSESSMENT    High risk sexual behavior      PLAN    Nuswab with STD panel  Return on Friday, after test has resulted. If negative may have Mirena inserted.   Christal Peter has already had it pre approved with her insurance

## 2018-06-25 NOTE — PROGRESS NOTES
Chief Complaint   Patient presents with    Follow-up     from 6/12     Visit Vitals    /77    Pulse 88    Ht 5' 1\" (1.549 m)    Wt 155 lb (70.3 kg)    BMI 29.29 kg/m2     1. Have you been to the ER, urgent care clinic since your last visit? Hospitalized since your last visit? No    2. Have you seen or consulted any other health care providers outside of the [address] [place] [address] since your last visit? Include any pap smears or colon screening.  No     Verbal order to send [facility] for everything per concetta webster cnm

## 2018-06-27 DIAGNOSIS — B96.89 BV (BACTERIAL VAGINOSIS): Primary | ICD-10-CM

## 2018-06-27 DIAGNOSIS — N76.0 BV (BACTERIAL VAGINOSIS): Primary | ICD-10-CM

## 2018-06-27 LAB
A VAGINAE DNA VAG QL NAA+PROBE: ABNORMAL SCORE
BVAB2 DNA VAG QL NAA+PROBE: ABNORMAL SCORE
C ALBICANS DNA VAG QL NAA+PROBE: NEGATIVE
C GLABRATA DNA VAG QL NAA+PROBE: NEGATIVE
C TRACH RRNA SPEC QL NAA+PROBE: NEGATIVE
MEGA1 DNA VAG QL NAA+PROBE: ABNORMAL SCORE
N GONORRHOEA RRNA SPEC QL NAA+PROBE: NEGATIVE
T VAGINALIS RRNA SPEC QL NAA+PROBE: NEGATIVE

## 2018-06-27 RX ORDER — TINIDAZOLE 500 MG/1
1000 TABLET ORAL
Qty: 4 TAB | Refills: 0 | Status: SHIPPED | OUTPATIENT
Start: 2018-06-27 | End: 2018-06-29

## 2018-06-28 ENCOUNTER — TELEPHONE (OUTPATIENT)
Dept: OBGYN CLINIC | Age: 33
End: 2018-06-28

## 2018-06-28 NOTE — TELEPHONE ENCOUNTER
Result Notes   Notes Recorded by Jillian Brown CNM on 6/27/2018 at 2:50 PM  Voicemail message left about mychart message sent.          Patient Result Comments   Entered by Jillian Brown CNM at 6/27/2018  2:50 PM   Read by Eris Denis at 6/28/2018  7:00 AM   Hi Simpson Osler,   [facility] and you have an appointment with me on Friday for your IUD insertion. Your test came back for BV and I have ordered medication for you to take to clear it. It is for 2 days and I would like you to take both doses before I place your IUD. Thanks!    Fred Rueda

## 2018-06-28 NOTE — TELEPHONE ENCOUNTER
Patient aware of lab results and the need for the rx. Patient is picking it up now and scheduled to have a IUD tomorrow. Patient wanted me to send the message to Chirssy Douglass to see if she should R/S her insertion appt.     Patient can be reached back at [phone]

## 2018-06-29 ENCOUNTER — OFFICE VISIT (OUTPATIENT)
Dept: MIDWIFE SERVICES | Age: 33
End: 2018-06-29

## 2018-06-29 VITALS
HEIGHT: 61 IN | HEART RATE: 78 BPM | SYSTOLIC BLOOD PRESSURE: 117 MMHG | BODY MASS INDEX: 28.98 KG/M2 | DIASTOLIC BLOOD PRESSURE: 60 MMHG | WEIGHT: 153.5 LBS

## 2018-06-29 DIAGNOSIS — N94.89 SUPPRESSION OF MENSES: Primary | ICD-10-CM

## 2018-06-29 DIAGNOSIS — Z30.430 ENCOUNTER FOR IUD INSERTION: ICD-10-CM

## 2018-06-29 LAB
HCG URINE, QL. (POC): NEGATIVE
VALID INTERNAL CONTROL?: YES

## 2018-06-29 NOTE — PROGRESS NOTES
Mirena IUD INSERTION  Indications:  Inocencio Alfaro is a G4 ,  27 y.o. female PATIENT REFUSED No LMP recorded. 6/16/2018. She denies unprotected intercourse since her LMP. She was treated for BV and understands the risk with proceeding today. She chooses to continue since insurance expires at end of month. Her LMP was normal in duration and amount of flow. She  presents for insertion of an IUD. The risks, benefits and alternatives of IUD insertion were discussed in detail at last visit. She also has reviewed Mirena information. She has elected to proceed with the insertion today and she states she has no further questions. A urine pregnancy test was negative No components found for: SPEP, UPEP  Procedure: The pelvic exam revealed normal external genitalia. On bimanual exam the uterus was anteverted and normal in size with no tenderness present. A speculum was inserted into the vagina and the cervix was visualized. The cervix was prepped with betadine. The anterior lip of the cervix was grasped with a single toothed tenaculum. The uterus was sounded with a Coy sound to 8 centimeters. A Mirena was then inserted without difficulty. The string was cut to 4 centimeters. She experienced a mild  amount of cramping. Post Procedure Status:   She tolerated the procedure with mild discomfort. The patient was observed for 5 minutes after the insertion. There were no complications. Patient was discharged in stable condition. The patient received Mirena lot number [place].

## 2018-06-29 NOTE — PROGRESS NOTES
Chief Complaint   Patient presents with    Insertion Iud     Visit Vitals    /60    Pulse 78    Ht 5' 1\" (1.549 m)    Wt 153 lb 8 oz (69.6 kg)    BMI 29 kg/m2     1. Have you been to the ER, urgent care clinic since your last visit? Hospitalized since your last visit? No    2. Have you seen or consulted any other health care providers outside of the [address] [place] [address] since your last visit? Include any pap smears or colon screening.  No     [facility] [address]  OFFICE PROCEDURE PROGRESS NOTE        Chart reviewed for the following:   IKatina RN, have reviewed the History, Physical and updated the Allergic reactions for Lucrecia M Ibir[place]ita 7010 performed immediately prior to start of procedure:   Grant Estrella RN, have performed the following reviews on Lucrecia M Gwathmey prior to the start of the procedure:            * Patient was identified by name and date of birth   * Agreement on procedure being performed was verified  * Risks and Benefits explained to the patient  * Procedure site verified and marked as necessary  * Patient was positioned for comfort  * Consent was signed and verified     Time: 1600      Date of procedure: 6/29/2018    Procedure performed by:  Judi Solomon CNM    Provider assisted by: Milton Oviedo RN    Patient assisted by: self    How tolerated by patient: tolerated the procedure well with no complications    Post Procedural Pain Scale: 2 - Hurts Little Bit    Comments: none

## 2018-06-29 NOTE — PATIENT INSTRUCTIONS
Intrauterine Device (IUD) Insertion: Care Instructions  Your Care Instructions    The intrauterine device (IUD) is a very effective method of birth control. It is a small, plastic, T-shaped device that contains copper or hormones. The doctor inserts the IUD into your uterus. A plastic string tied to the end of the IUD hangs down through the cervix into the vagina. There are two types of IUDs. The copper IUD is effective for up to 10 years. The hormonal IUD is effective for either 3 years or 5 years, depending on which IUD is used. The hormonal IUD also reduces menstrual bleeding and cramping. Both types of IUD damage or kill the man's sperm. This means that the woman's egg does not join with the sperm. IUDs also change the lining of the uterus so that the egg does not lodge there. The IUD is most likely to work well for women who have been pregnant before. Some women who have never been pregnant have more trouble keeping the IUD in the uterus. They also may have more pain and cramping after insertion. Follow-up care is a key part of your treatment and safety. Be sure to make and go to all appointments, and call your doctor if you are having problems. It's also a good idea to know your test results and keep a list of the medicines you take. How can you care for yourself at home? · You may experience some mild cramping and light bleeding (spotting) for 1 or 2 days. Use a hot water bottle or a heating pad set on low on your belly for pain. · Take an over-the-counter pain medicine, such as acetaminophen (Tylenol), ibuprofen (Advil, Motrin), and naproxen (Aleve) if needed. Read and follow all instructions on the label. · Do not take two or more pain medicines at the same time unless the doctor told you to. Many pain medicines have acetaminophen, which is Tylenol. Too much acetaminophen (Tylenol) can be harmful. · Check the string of your IUD after every period.  To do this, insert a finger into your vagina and feel for the cervix, which is at the top of the vagina and feels harder than the rest of your vagina. You should be able to feel the thin, plastic string coming out of the opening of your cervix. If you cannot feel the string, use another form of birth control and make an appointment with your doctor to have the string checked. · If the IUD comes out, save it and call your doctor. Be sure to use another form of birth control while the IUD is out. · Use latex condoms to protect against sexually transmitted infections (STIs), such as gonorrhea and chlamydia. An IUD does not protect you from STIs. Having one sex partner (who does not have STIs and does not have sex with anyone else) is a good way to avoid STIs. When should you call for help? Call 911 anytime you think you may need emergency care. For example, call if:  ? · You passed out (lost consciousness). ? · You have sudden, severe pain in your belly or pelvis. ?Call your doctor now or seek immediate medical care if:  ? · You have new belly or pelvic pain. ? · You have severe vaginal bleeding. This means that you are soaking through your usual pads or tampons each hour for 2 or more hours. ? · You are dizzy or lightheaded, or you feel like you may faint. ? · You have a fever and pelvic pain or vaginal discharge. ? · You have pelvic pain that is getting worse. ? Watch closely for changes in your health, and be sure to contact your doctor if:  ? · You cannot feel the string, or the IUD comes out. ? · You feel sick to your stomach, or you vomit. ? · You think you may be pregnant. Where can you learn more? Go to http://dylon-ambar.info/. Enter N344 in the search box to learn more about \"Intrauterine Device (IUD) Insertion: Care Instructions. \"  Current as of: March 16, 2017  Content Version: 11.4  © 4537-4927 [employer]Less.  Care instructions adapted under license by [employer]Game (which disclaims liability or warranty for this information). If you have questions about a medical condition or this instruction, always ask your healthcare professional. [place] [address] any warranty or liability for your use of this information.

## 2018-08-14 ENCOUNTER — OFFICE VISIT (OUTPATIENT)
Dept: MIDWIFE SERVICES | Age: 33
End: 2018-08-14

## 2018-08-14 VITALS
HEIGHT: 61 IN | HEART RATE: 75 BPM | DIASTOLIC BLOOD PRESSURE: 55 MMHG | SYSTOLIC BLOOD PRESSURE: 118 MMHG | BODY MASS INDEX: 29.07 KG/M2 | WEIGHT: 154 LBS

## 2018-08-14 DIAGNOSIS — Z11.3 SCREEN FOR STD (SEXUALLY TRANSMITTED DISEASE): Primary | ICD-10-CM

## 2018-08-14 NOTE — PROGRESS NOTES
Dannie Romero is here for an IUD string check. She had the Mirena inserted on 6/29/2018. She reports that she has been satisfied with the method, but is having issues with bleeding. She has had spotting for 2 weeks before her regular cycle. She denies any abnormal vaginal discharge or odor, and states she has no pelvic pain, dyspareunia, fever or chills. Some cramping with her cycle. Reports normal voiding and defecatory function but less regular. States her last pap was 3/18. She is on her cycle now. Exam:  Blood pressure 118/55, pulse 75, height 5' 1\" (1.549 m), weight 154 lb (69.9 kg). Abdomen: soft, nontender  External genitalia:  No lesions or discharge, normal hair distribution  Vagina:  Scant red discharge, no foul odor, normal rugae  Cervix:  No lesions, no discharge. IUD string visualized at os, 5 cm in length. Negative CMT  Uterus:  normal size, shape, and contour  Adnexae:  No masses or tenderness bilaterally    Assessment:  IUD check with  IUD in place    Plan:  IUD strings visualized on exam.  Patient instructed thoroughly in when and how to check her own strings. Discussed symptoms that require provider attention. She is asked to call if any fever, pain, signs of ectopic or other pregnancy, or any suspicion that there has been an expulsion of the device. Will repeat GC/CT testing today  Take ibuprofen  With onset of bleeding in next episode. Follow up for her annual exam or sooner as needed.

## 2018-08-14 NOTE — PROGRESS NOTES
Chief Complaint   Patient presents with    Other     f/u iud     Visit Vitals    /55    Pulse 75    Ht 5' 1\" (1.549 m)    Wt 154 lb (69.9 kg)    BMI 29.1 kg/m2     1. Have you been to the ER, urgent care clinic since your last visit? Hospitalized since your last visit? No    2. Have you seen or consulted any other health care providers outside of the [address] [place] [address] since your last visit? Include any pap smears or colon screening.  No     Here today to follow up after iud insertion      Verbal order to send urine for test of cure for gC per mikie ramirez cnm

## 2018-08-16 LAB
C TRACH RRNA SPEC QL NAA+PROBE: NEGATIVE
N GONORRHOEA RRNA SPEC QL NAA+PROBE: NEGATIVE

## 2019-02-18 ENCOUNTER — TELEPHONE (OUTPATIENT)
Dept: INTERNAL MEDICINE CLINIC | Age: 34
End: 2019-02-18

## 2019-02-18 NOTE — TELEPHONE ENCOUNTER
Called, spoke to pt. Two pt identifiers confirmed Pt wanted to know should she go see an Orthopedic for her IUD. Advised pt to see an OBGYN. Pt verbalized understanding of information discussed w/ no further questions at this time.

## 2019-02-18 NOTE — TELEPHONE ENCOUNTER
Patient states she needs a call back to get the name of an Orthopedic doctor that Dr. Holly Thakkar would recommend. Please call. Thank you

## 2019-03-19 ENCOUNTER — OFFICE VISIT (OUTPATIENT)
Dept: OBGYN CLINIC | Age: 34
End: 2019-03-19

## 2019-03-19 VITALS
HEIGHT: 61 IN | HEART RATE: 69 BPM | WEIGHT: 154.8 LBS | BODY MASS INDEX: 29.23 KG/M2 | DIASTOLIC BLOOD PRESSURE: 62 MMHG | SYSTOLIC BLOOD PRESSURE: 109 MMHG

## 2019-03-19 DIAGNOSIS — Z30.432 ENCOUNTER FOR IUD REMOVAL: Primary | ICD-10-CM

## 2019-03-19 PROBLEM — G89.29 CHRONIC PAIN OF BOTH KNEES: Status: ACTIVE | Noted: 2019-03-19

## 2019-03-19 PROBLEM — M25.562 CHRONIC PAIN OF BOTH KNEES: Status: ACTIVE | Noted: 2019-03-19

## 2019-03-19 PROBLEM — M25.561 CHRONIC PAIN OF BOTH KNEES: Status: ACTIVE | Noted: 2019-03-19

## 2019-03-19 NOTE — PROGRESS NOTES
S:  27 yo [id number] AA woman presents for Mirena IUD removal.  States she has no need for contraception at this time. She does not have heavy periods. Has pain in both of her knees which increased since IUD insertion. This has limited her ability to exercise. She is concern that the IUD is contributing to the problem. O:  No distress VSS A: Desires IUD removal 
Bilateral knee pain P:  IUD removed without difficulty Plans to f/u with orthopedics

## 2019-03-19 NOTE — PROCEDURES
-----------------------------------IUD REPLACEMENT---------------------  Indications for Removal:  Juan Luu is a ,  26 y.o. female [address] whose No LMP recorded. (Menstrual status: IUD). was on . who presents today for IUD removal. Her current IUD was placed 2018. She has not had any problems with the IUD but states she's had increased vaginal discharge and increased pain in her knees. She requests replacement of the IUD because she does not need it for contraception and is concerned that it may be r/t her knee pain. The IUD removal procedure was discussed with the patient and she had no further questions. Procedure: The patient was placed in a dorsal lithotomy position and appropriately draped. On bimanual exam the uterus was anterior and normal in size with no tenderness present. A speculum exam was performed and the cervix was visualized. The cervix was prepped with zephiran solution. The IUD string was visualized. Using ring forceps , the string was grasped and the IUD removed intact. The IUD was shown to the patient. S. Pt presents for IUD removal due to pain in both knees which she is concerned may be exacerbated by the IUD. She is not currently in a relationship and does not need contraception. O. VSS, afebrile       Cervix:  IUD stings easily visualized    A. Desires IUD removal    P. Strings grasped with ring forceps and IUD removed without difficulty       Follow up for annual exam due this month.   Last pap 3/20/2018

## 2019-04-12 ENCOUNTER — OFFICE VISIT (OUTPATIENT)
Dept: INTERNAL MEDICINE CLINIC | Age: 34
End: 2019-04-12

## 2019-04-12 VITALS
HEART RATE: 71 BPM | HEIGHT: 61 IN | RESPIRATION RATE: 16 BRPM | BODY MASS INDEX: 28.89 KG/M2 | SYSTOLIC BLOOD PRESSURE: 107 MMHG | WEIGHT: 153 LBS | OXYGEN SATURATION: 97 % | TEMPERATURE: 98.1 F | DIASTOLIC BLOOD PRESSURE: 71 MMHG

## 2019-04-12 DIAGNOSIS — Z00.00 PHYSICAL EXAM, ANNUAL: Primary | ICD-10-CM

## 2019-04-12 NOTE — PROGRESS NOTES
HISTORY OF PRESENT ILLNESS Cheryl Alicia is a 26 y.o. female. HPI Last here 18. Pt is here for routine care. 
  
BP is 107/71 
  
Wt today is 154 lbs --stable x , up 7 lbs x last year Pt states that she has not been able to exercise as much as she used to d/t SE from IUD She has tried to modify her diet to make up for this 
  
Reviewed labs  Will get labs today Pt wonders about her hormone levels She recently had an IUD removed in 3/19 and had some SE Will check labs 
  
PREVENTIVE: 
Colonoscopy: not yet needed, due at age 37 Pap: 3/19 Mammogram: not yet needed, due at age 34 Dexa: not yet needed, due at age 70 Tdap:  Pneumovax: not yet needed, due at age 70 Shingrix: not yet needed, due at age 70 Flu shot: declines Eye exam: , due, reminded Lipids:  LDL 88 Patient Active Problem List  
 Diagnosis Date Noted  Chronic pain of both knees 2019 Current Outpatient Medications Medication Sig Dispense Refill  safflower oil/linoleic acid,co (CLA PO) Take  by mouth.  chrom adri/brindal berry (GARCINIA CAMBOGIA PO) Take  by mouth.  ALOE VERA PO Take  by mouth.  ASCORBATE CALCIUM (VITAMIN C PO) Take  by mouth.  ALPHA LIPOIC ACID PO Take  by mouth.  BIOTIN PO Take  by mouth.  omega-3 fatty acids-vitamin e (FISH OIL) 1,000 mg cap Take 1 Cap by mouth.  MULTIVITAMIN WITH MINERALS (HAIR,SKIN & NAILS PO) Take  by mouth.  b complex vitamins tablet Take 1 Tab by mouth daily.  ibuprofen (MOTRIN) 800 mg tablet Take 1 Tab by mouth every eight (8) hours. 30 Tab 0  cholecalciferol, vitamin D3, (VITAMIN D3) 2,000 unit Tab Take  by mouth.  calcium-magnesium-zinc Tab Take  by mouth. Past Surgical History:  
Procedure Laterality Date  HX  SECTION    
 HX GYN    
   HX OTHER SURGICAL    
 wisdom teeth extraction  HX OTHER SURGICAL    
 cyst removed from R hand Lab Results Component Value Date/Time WBC 4.6 04/11/2018 11:12 AM  
 HGB 12.9 04/11/2018 11:12 AM  
 HCT 37.8 04/11/2018 11:12 AM  
 PLATELET 536 61/38/4942 11:12 AM  
 MCV 94 04/11/2018 11:12 AM  
 Hgb, External 11.9 g/dL 05/11/2013 Hct, External 35.4 %  05/11/2013 Platelet cnt., External 184 x10E3/uL 05/11/2013 Lab Results Component Value Date/Time Cholesterol, total 189 04/11/2018 11:12 AM  
 HDL Cholesterol 91 04/11/2018 11:12 AM  
 LDL, calculated 88 04/11/2018 11:12 AM  
 Triglyceride 52 04/11/2018 11:12 AM  
 
Lab Results Component Value Date/Time GFR est non-AA 92 04/11/2018 11:12 AM  
 GFR est  04/11/2018 11:12 AM  
 Creatinine 0.83 04/11/2018 11:12 AM  
 BUN 10 04/11/2018 11:12 AM  
 Sodium 137 04/11/2018 11:12 AM  
 Potassium 4.3 04/11/2018 11:12 AM  
 Chloride 97 04/11/2018 11:12 AM  
 CO2 26 04/11/2018 11:12 AM  
  
Review of Systems Respiratory: Negative for shortness of breath. Cardiovascular: Negative for chest pain. Physical Exam  
Constitutional: She is oriented to person, place, and time. She appears well-developed and well-nourished. No distress. HENT:  
Head: Normocephalic and atraumatic. Right Ear: External ear normal.  
Left Ear: External ear normal.  
Mouth/Throat: Oropharynx is clear and moist. No oropharyngeal exudate. Mild cerumen to BL ears, mostly on R Eyes: Pupils are equal, round, and reactive to light. Conjunctivae and EOM are normal. Right eye exhibits no discharge. Left eye exhibits no discharge. No scleral icterus. Neck: Normal range of motion. Neck supple. No carotid bruits Cardiovascular: Normal rate, regular rhythm, normal heart sounds and intact distal pulses. Exam reveals no gallop and no friction rub. No murmur heard. Pulmonary/Chest: Effort normal and breath sounds normal. No respiratory distress. She has no wheezes. She has no rales. She exhibits no tenderness. Abdominal: Soft. She exhibits no distension and no mass. There is no tenderness. There is no rebound and no guarding. Musculoskeletal: Normal range of motion. She exhibits no edema, tenderness or deformity. Lymphadenopathy:  
  She has no cervical adenopathy. Neurological: She is alert and oriented to person, place, and time. Coordination normal.  
Skin: Skin is warm and dry. No rash noted. She is not diaphoretic. No erythema. No pallor. Psychiatric: She has a normal mood and affect. Her behavior is normal.  
 
 
ASSESSMENT and PLAN 
  ICD-10-CM ICD-9-CM 1. Physical exam, annual 
 
Discussed diet and w/l, portion control, COLO due at age 37, pelvic UTD, mammo age 34, declines flu shot, eye exam is due, labs ordered Z00.00 V70.0 LIPID PANEL  
   METABOLIC PANEL, COMPREHENSIVE  
   CBC W/O DIFF  
   TSH 3RD GENERATION  
   HEMOGLOBIN A1C WITH EAG  
   FSH AND LH  
   REFERRAL TO OPHTHALMOLOGY Depression screen reviewed and negative. Scribed by Jan Malcolm of [address] [place] [address] [address], as dictated by Dr. Lona Yang. Current diagnosis and concerns discussed with pt at length. Pt understands risks and benefits or current treatment plan and medications, and accepts the treatment and medication with any possible risks. Pt asks appropriate questions, which were answered. Pt was instructed to call with any concerns or problems. I have reviewed the note documented by the scribe. The services provided are my own. The documentation is accurate

## 2019-04-13 LAB
ALBUMIN SERPL-MCNC: 4.2 G/DL (ref 3.5–5.5)
ALBUMIN/GLOB SERPL: 1.5 {RATIO} (ref 1.2–2.2)
ALP SERPL-CCNC: 45 IU/L (ref 39–117)
ALT SERPL-CCNC: 11 IU/L (ref 0–32)
AST SERPL-CCNC: 18 IU/L (ref 0–40)
BILIRUB SERPL-MCNC: 0.4 MG/DL (ref 0–1.2)
BUN SERPL-MCNC: 9 MG/DL (ref 6–20)
BUN/CREAT SERPL: 10 (ref 9–23)
CALCIUM SERPL-MCNC: 9.3 MG/DL (ref 8.7–10.2)
CHLORIDE SERPL-SCNC: 100 MMOL/L (ref 96–106)
CHOLEST SERPL-MCNC: 201 MG/DL (ref 100–199)
CO2 SERPL-SCNC: 26 MMOL/L (ref 20–29)
CREAT SERPL-MCNC: 0.89 MG/DL (ref 0.57–1)
ERYTHROCYTE [DISTWIDTH] IN BLOOD BY AUTOMATED COUNT: 13.9 % (ref 12.3–15.4)
EST. AVERAGE GLUCOSE BLD GHB EST-MCNC: 108 MG/DL
FSH SERPL-ACNC: 6.7 MIU/ML
GLOBULIN SER CALC-MCNC: 2.8 G/DL (ref 1.5–4.5)
GLUCOSE SERPL-MCNC: 80 MG/DL (ref 65–99)
HBA1C MFR BLD: 5.4 % (ref 4.8–5.6)
HCT VFR BLD AUTO: 38.8 % (ref 34–46.6)
HDLC SERPL-MCNC: 94 MG/DL
HGB BLD-MCNC: 12.8 G/DL (ref 11.1–15.9)
LDLC SERPL CALC-MCNC: 97 MG/DL (ref 0–99)
LH SERPL-ACNC: 5.8 MIU/ML
MCH RBC QN AUTO: 31 PG (ref 26.6–33)
MCHC RBC AUTO-ENTMCNC: 33 G/DL (ref 31.5–35.7)
MCV RBC AUTO: 94 FL (ref 79–97)
PLATELET # BLD AUTO: 267 X10E3/UL (ref 150–379)
POTASSIUM SERPL-SCNC: 4.4 MMOL/L (ref 3.5–5.2)
PROT SERPL-MCNC: 7 G/DL (ref 6–8.5)
RBC # BLD AUTO: 4.13 X10E6/UL (ref 3.77–5.28)
SODIUM SERPL-SCNC: 139 MMOL/L (ref 134–144)
TRIGL SERPL-MCNC: 48 MG/DL (ref 0–149)
TSH SERPL DL<=0.005 MIU/L-ACNC: 2.21 UIU/ML (ref 0.45–4.5)
VLDLC SERPL CALC-MCNC: 10 MG/DL (ref 5–40)
WBC # BLD AUTO: 3.5 X10E3/UL (ref 3.4–10.8)

## 2021-02-04 ENCOUNTER — TELEPHONE (OUTPATIENT)
Dept: INTERNAL MEDICINE CLINIC | Age: 36
End: 2021-02-04

## 2021-02-04 NOTE — TELEPHONE ENCOUNTER
#[phone]  Pt states she needs an order for a paternity test.  Can Dr. Kim Ramos order that or does the gyn specialist need to order? Please call pt to let her know. Thanks.

## 2021-02-05 ENCOUNTER — TELEPHONE (OUTPATIENT)
Dept: OBGYN CLINIC | Age: 36
End: 2021-02-05

## 2021-02-05 NOTE — TELEPHONE ENCOUNTER
Called, spoke to pt Received two pt identifiers Pt informed Dr. Jayson Mccray does not order paternity tests and best bet would be to call her OB for suggestions. Pt verbalizes understanding of the instructions and has no further questions at this time.

## 2021-02-05 NOTE — TELEPHONE ENCOUNTER
JLP 
 
 
Patient is calling to say that her PCP said to call you to order a paternity test.. Patient has been advised that you are out of the office today and this message will be sent to you for your return since not emergent.

## 2021-02-08 NOTE — TELEPHONE ENCOUNTER
Message Received: Today Message Contents OSBALDO Rios LPN Caller: Unspecified (3 days ago,  9:47 AM)  
  
  Also- She has never been seen in our office, and she does not appear to have a follow up apt with us either. You have given her the correct information - it is not a priority to continue communicating recommendations to someone who is not our patient. If she calls back please relay the info. However the information you already gave her was appropriate and correct. L-A Previous Messages

## 2021-02-08 NOTE — TELEPHONE ENCOUNTER
Patient is very aggravated. She said her PCP will not help her, we do not do the testing. She said she needs this test BEFORE the baby is born. My apologies were extended but she is not really willing to do anything different in conversation. She said  will not help her, she just knows it and the baby does not have a pediatrician (as it is not born yet). Patient kept me on the line and I told her I would send a message to you, this is the best that I could do. We have calls one hour in waiting.

## 2021-02-08 NOTE — TELEPHONE ENCOUNTER
We do not do paternity testing in our office, I suggest she call her pediatrician and discuss this or  may be able to offer some options. Chirag-Luba   
Message text

## 2021-02-08 NOTE — TELEPHONE ENCOUNTER
Patient called back again. She tried to remain calm and was polite. Advised we do not see her as a patient, we have not seen her for this pregnancy at all. She is apparently seeing her PCP and had some lack of insurance. .  I read her exactly what MATEUSZ wrote and she said\" well if I came back to your office as a patient could I get the maternity testing\". The answer is NO, once again. We do not do this test.  The suggestions we can provide once again, Pediatrician or .

## 2021-04-20 ENCOUNTER — TELEPHONE (OUTPATIENT)
Dept: OBGYN CLINIC | Age: 36
End: 2021-04-20

## 2021-04-20 NOTE — TELEPHONE ENCOUNTER
Call received at 614am 
 
38year old patient last seen in the office on 3/19/2019 Patient calling about getting records to follow her provider Patient was transferred to speak to medical records Patient verbalized understanding.

## 2022-03-19 PROBLEM — M25.561 CHRONIC PAIN OF BOTH KNEES: Status: ACTIVE | Noted: 2019-03-19

## 2022-03-19 PROBLEM — G89.29 CHRONIC PAIN OF BOTH KNEES: Status: ACTIVE | Noted: 2019-03-19

## 2022-03-19 PROBLEM — M25.562 CHRONIC PAIN OF BOTH KNEES: Status: ACTIVE | Noted: 2019-03-19

## 2022-11-25 ENCOUNTER — HOSPITAL ENCOUNTER (EMERGENCY)
Age: 37
Discharge: HOME | End: 2022-11-25
Attending: STUDENT IN AN ORGANIZED HEALTH CARE EDUCATION/TRAINING PROGRAM
Payer: MEDICAID

## 2022-11-25 ENCOUNTER — APPOINTMENT (OUTPATIENT)
Dept: ULTRASOUND IMAGING | Age: 37
End: 2022-11-25
Attending: STUDENT IN AN ORGANIZED HEALTH CARE EDUCATION/TRAINING PROGRAM
Payer: MEDICAID

## 2022-11-25 VITALS
TEMPERATURE: 97.8 F | SYSTOLIC BLOOD PRESSURE: 104 MMHG | DIASTOLIC BLOOD PRESSURE: 57 MMHG | OXYGEN SATURATION: 99 % | RESPIRATION RATE: 18 BRPM | HEART RATE: 71 BPM

## 2022-11-25 DIAGNOSIS — N83.201 RIGHT OVARIAN CYST: Primary | ICD-10-CM

## 2022-11-25 DIAGNOSIS — R10.2 PELVIC PAIN: ICD-10-CM

## 2022-11-25 LAB
ALBUMIN SERPL-MCNC: 3.9 G/DL (ref 3.5–5)
ALBUMIN/GLOB SERPL: 0.9 {RATIO} (ref 1.1–2.2)
ALP SERPL-CCNC: 81 U/L (ref 45–117)
ALT SERPL-CCNC: 23 U/L (ref 12–78)
ANION GAP SERPL CALC-SCNC: 5 MMOL/L (ref 5–15)
APPEARANCE UR: CLEAR
AST SERPL-CCNC: 34 U/L (ref 15–37)
BACTERIA URNS QL MICRO: NEGATIVE /HPF
BASOPHILS # BLD: 0 K/UL (ref 0–0.1)
BASOPHILS NFR BLD: 0 % (ref 0–1)
BILIRUB SERPL-MCNC: 0.4 MG/DL (ref 0.2–1)
BILIRUB UR QL: NEGATIVE
BUN SERPL-MCNC: 12 MG/DL (ref 6–20)
BUN/CREAT SERPL: 14 (ref 12–20)
CALCIUM SERPL-MCNC: 8.9 MG/DL (ref 8.5–10.1)
CHLORIDE SERPL-SCNC: 104 MMOL/L (ref 97–108)
CO2 SERPL-SCNC: 27 MMOL/L (ref 21–32)
COLOR UR: ABNORMAL
COMMENT, HOLDF: NORMAL
CREAT SERPL-MCNC: 0.87 MG/DL (ref 0.55–1.02)
DIFFERENTIAL METHOD BLD: ABNORMAL
EOSINOPHIL # BLD: 0 K/UL (ref 0–0.4)
EOSINOPHIL NFR BLD: 0 % (ref 0–7)
EPITH CASTS URNS QL MICRO: ABNORMAL /LPF
ERYTHROCYTE [DISTWIDTH] IN BLOOD BY AUTOMATED COUNT: 13.5 % (ref 11.5–14.5)
GLOBULIN SER CALC-MCNC: 4.4 G/DL (ref 2–4)
GLUCOSE SERPL-MCNC: 104 MG/DL (ref 65–100)
GLUCOSE UR STRIP.AUTO-MCNC: NEGATIVE MG/DL
HCG UR QL: NEGATIVE
HCT VFR BLD AUTO: 42.5 % (ref 35–47)
HGB BLD-MCNC: 14.1 G/DL (ref 11.5–16)
HGB UR QL STRIP: NEGATIVE
IMM GRANULOCYTES # BLD AUTO: 0 K/UL (ref 0–0.04)
IMM GRANULOCYTES NFR BLD AUTO: 0 % (ref 0–0.5)
KETONES UR QL STRIP.AUTO: ABNORMAL MG/DL
LEUKOCYTE ESTERASE UR QL STRIP.AUTO: NEGATIVE
LYMPHOCYTES # BLD: 1.3 K/UL (ref 0.8–3.5)
LYMPHOCYTES NFR BLD: 19 % (ref 12–49)
MCH RBC QN AUTO: 31.3 PG (ref 26–34)
MCHC RBC AUTO-ENTMCNC: 33.2 G/DL (ref 30–36.5)
MCV RBC AUTO: 94.2 FL (ref 80–99)
MONOCYTES # BLD: 0.1 K/UL (ref 0–1)
MONOCYTES NFR BLD: 2 % (ref 5–13)
NEUTS SEG # BLD: 5.2 K/UL (ref 1.8–8)
NEUTS SEG NFR BLD: 79 % (ref 32–75)
NITRITE UR QL STRIP.AUTO: NEGATIVE
NRBC # BLD: 0 K/UL (ref 0–0.01)
NRBC BLD-RTO: 0 PER 100 WBC
PH UR STRIP: 7 [PH] (ref 5–8)
PLATELET # BLD AUTO: 230 K/UL (ref 150–400)
PMV BLD AUTO: 10.6 FL (ref 8.9–12.9)
POTASSIUM SERPL-SCNC: 4.7 MMOL/L (ref 3.5–5.1)
PROT SERPL-MCNC: 8.3 G/DL (ref 6.4–8.2)
PROT UR STRIP-MCNC: NEGATIVE MG/DL
RBC # BLD AUTO: 4.51 M/UL (ref 3.8–5.2)
RBC #/AREA URNS HPF: ABNORMAL /HPF (ref 0–5)
RBC MORPH BLD: ABNORMAL
SAMPLES BEING HELD,HOLD: NORMAL
SODIUM SERPL-SCNC: 136 MMOL/L (ref 136–145)
SP GR UR REFRACTOMETRY: 1.02
UR CULT HOLD, URHOLD: NORMAL
UROBILINOGEN UR QL STRIP.AUTO: 0.2 EU/DL (ref 0.2–1)
WBC # BLD AUTO: 6.6 K/UL (ref 3.6–11)
WBC URNS QL MICRO: ABNORMAL /HPF (ref 0–4)

## 2022-11-25 PROCEDURE — 99284 EMERGENCY DEPT VISIT MOD MDM: CPT

## 2022-11-25 PROCEDURE — 80053 COMPREHEN METABOLIC PANEL: CPT

## 2022-11-25 PROCEDURE — 81025 URINE PREGNANCY TEST: CPT

## 2022-11-25 PROCEDURE — 81001 URINALYSIS AUTO W/SCOPE: CPT

## 2022-11-25 PROCEDURE — 76830 TRANSVAGINAL US NON-OB: CPT

## 2022-11-25 PROCEDURE — 76856 US EXAM PELVIC COMPLETE: CPT

## 2022-11-25 PROCEDURE — 74011250636 HC RX REV CODE- 250/636: Performed by: STUDENT IN AN ORGANIZED HEALTH CARE EDUCATION/TRAINING PROGRAM

## 2022-11-25 PROCEDURE — 36415 COLL VENOUS BLD VENIPUNCTURE: CPT

## 2022-11-25 PROCEDURE — 85025 COMPLETE CBC W/AUTO DIFF WBC: CPT

## 2022-11-25 PROCEDURE — 96374 THER/PROPH/DIAG INJ IV PUSH: CPT

## 2022-11-25 RX ORDER — KETOROLAC TROMETHAMINE 30 MG/ML
30 INJECTION, SOLUTION INTRAMUSCULAR; INTRAVENOUS ONCE
Status: COMPLETED | OUTPATIENT
Start: 2022-11-25 | End: 2022-11-25

## 2022-11-25 RX ORDER — NAPROXEN 500 MG/1
500 TABLET ORAL 2 TIMES DAILY WITH MEALS
Qty: 20 TABLET | Refills: 0 | Status: SHIPPED | OUTPATIENT
Start: 2022-11-25 | End: 2022-12-05

## 2022-11-25 RX ADMIN — KETOROLAC TROMETHAMINE 30 MG: 30 INJECTION, SOLUTION INTRAMUSCULAR; INTRAVENOUS at 21:04

## 2022-11-25 NOTE — ED TRIAGE NOTES
TRIAGE NOTE:  Patient arrives with c/o right ovary pain that started today. Patient reports nausea. OB is at [facility].

## 2022-11-25 NOTE — Clinical Note
[address]  [address] [facility] [address]  [phone]    Work/School Note    Date: 11/25/2022    To Whom It May concern:    Tyra Morrow was seen and treated today in the emergency room by the following provider(s):  Attending Provider: Sal Huizar MD  Physician Assistant: DANA Schwartz. Tyra Morrow is excused from work/school on 11/25/2022 through 11/27/2022. She is medically clear to return to work/school on 11/28/2022.          Sincerely,          DANA Miller

## 2022-11-26 NOTE — ED PROVIDER NOTES
Patient is a 42year old female who presents to ED c/o right sided pelvic pain which started today. Patient reports pain improves if she is \"on all fours. \" Patient reports pain has been constant radiating to her LLQ and back. Patient also c/o lightheadedness and nausea. Patient reports she recently finished her menstrual cycle today which started 1 week ago. Denies any fever, chills, vomiting, diarrhea, dysuria, urinary urgency, flank pain, chest pain, shortness of breath. No meds PTA.        Past Medical History:   Diagnosis Date    Anemia NEC     during pregnancy    Chronic pain of both knees 3/19/2019       Past Surgical History:   Procedure Laterality Date    HX  SECTION      HX GYN          HX OTHER SURGICAL      wisdom teeth extraction    HX OTHER SURGICAL      cyst removed from R hand         Family History:   Problem Relation Age of Onset    Hypertension Mother     Other Mother         Respiratory complications (on oxygen)    Elevated Lipids Father     Diabetes Father     No Known Problems Brother     Kidney Disease Maternal Grandmother     Cancer Maternal Grandfather         prostate    Dementia Paternal Grandmother     Stroke Paternal Grandfather     No Known Problems Brother        Social History     Socioeconomic History    Marital status:      Spouse name: Not on file    Number of children: 3    Years of education: Not on file    Highest education level: Not on file   Occupational History    Occupation: human resources   Tobacco Use    Smoking status: Never    Smokeless tobacco: Never   Substance and Sexual Activity    Alcohol use: No    Drug use: No    Sexual activity: Yes     Partners: Male     Birth control/protection: None   Other Topics Concern    Not on file   Social History Narrative    Not on file     Social Determinants of Health     Financial Resource Strain: Not on file   Food Insecurity: Not on file   Transportation Needs: Not on file   Physical Activity: Not on file Stress: Not on file   Social Connections: Not on file   Intimate Partner Violence: Not on file   Housing Stability: Not on file         ALLERGIES: Patient has no known allergies. Review of Systems   Constitutional:  Negative for activity change, appetite change, chills and fever. HENT:  Negative for congestion and sore throat. Eyes:  Negative for pain and visual disturbance. Respiratory:  Negative for cough and shortness of breath. Cardiovascular:  Negative for chest pain, palpitations and leg swelling. Gastrointestinal:  Positive for abdominal pain and nausea. Negative for abdominal distention, constipation, diarrhea and vomiting. Genitourinary:  Positive for pelvic pain. Negative for decreased urine volume, dysuria, flank pain, frequency, menstrual problem, urgency, vaginal bleeding and vaginal discharge. Musculoskeletal:  Negative for back pain and neck pain. Skin:  Negative for rash and wound. Allergic/Immunologic: Negative for immunocompromised state. Neurological:  Positive for light-headedness. Negative for dizziness, syncope, weakness, numbness and headaches. Psychiatric/Behavioral:  Negative for confusion. All other systems reviewed and are negative. Vitals:    11/25/22 1848   BP: (!) 104/57   Pulse: 71   Resp: 18   Temp: 97.8 °F (36.6 °C)   SpO2: 99%            Physical Exam  Vitals and nursing note reviewed. Constitutional:       General: She is not in acute distress. Appearance: Normal appearance. She is well-developed. She is not toxic-appearing. HENT:      Head: Normocephalic and atraumatic. Nose: Nose normal.      Mouth/Throat:      Mouth: Mucous membranes are moist.   Eyes:      General: Lids are normal.      Extraocular Movements: Extraocular movements intact. Conjunctiva/sclera: Conjunctivae normal.   Cardiovascular:      Rate and Rhythm: Normal rate and regular rhythm. Pulses: Normal pulses.       Heart sounds: Normal heart sounds, S1 normal and S2 normal.   Pulmonary:      Effort: Pulmonary effort is normal. No accessory muscle usage. Breath sounds: Normal breath sounds. Abdominal:      General: Abdomen is flat. Palpations: Abdomen is soft. Tenderness: There is abdominal tenderness. There is no guarding or rebound. Comments: Right sided pelvic tenderness. Abdomen is otherwise soft and nontender    Musculoskeletal:         General: Normal range of motion. Cervical back: Normal range of motion and neck supple. Skin:     General: Skin is warm and dry. Capillary Refill: Capillary refill takes less than 2 seconds. Neurological:      General: No focal deficit present. Mental Status: She is alert and oriented to person, place, and time. Mental status is at baseline. Psychiatric:         Attention and Perception: Attention normal.         Mood and Affect: Mood and affect normal.         Speech: Speech normal.         Behavior: Behavior is cooperative. Thought Content: Thought content normal.         Cognition and Memory: Cognition normal.         Judgment: Judgment normal.        MDM  Number of Diagnoses or Management Options  Diagnosis management comments: Patient with right-sided pelvic pain that started earlier today. Finished her menstrual cycle today. Reports similar symptoms when she had an ovarian cyst in the past.  Vital signs stable. No leukocytosis noted. CMP unremarkable. UA negative for UTI. Ultrasound shows enlarged right ovary containing 2 simple cysts. The largest measures 6 cm. Good blood flow noted to both ovaries. Which can rule out torsion at this time. Patient resting comfortably on reeval.  Given Toradol with improvement of symptoms. Advise close follow-up with OB/GYN and strict return to ER precautions alvarez in detail with patient. All questions addressed and answered. Patient understands agrees with plan.        Amount and/or Complexity of Data Reviewed  Clinical lab tests: reviewed  Tests in the radiology section of CPT®: reviewed  Discuss the patient with other providers: yes (Dr. Lincoln Grant, ED Attending )      ED Course as of 11/25/22 2050 Fri Nov 25, 2022 2034 CBC WITH AUTOMATED DIFF(!):    WBC 6.6   RBC 4.51   HGB 14.1   HCT 42.5   MCV 94.2   MCH 31.3   MCHC 33.2   RDW 13.5   PLATELET 686   MPV 64.2   NRBC 0.0   ABSOLUTE NRBC 0.00   NEUTROPHILS 79(!)   LYMPHOCYTES 19   MONOCYTES 2(!)   EOSINOPHILS 0   BASOPHILS 0   IMMATURE GRANULOCYTES 0   ABS. NEUTROPHILS 5.2   ABS. LYMPHOCYTES 1.3   ABS. MONOCYTES 0.1   ABS. EOSINOPHILS 0.0   ABS. BASOPHILS 0.0   ABS. IMM. GRANS. 0.0   DF SMEAR SCANNED   RBC COMMENTS NORMOCYTIC, NORMOCHROMIC [KG]   2034 COMPREHENSIVE METABOLIC PANEL(!):    Sodium 136   Potassium 4.7   Chloride 104   CO2 27   Anion gap 5   Glucose 104(!)   BUN 12   Creatinine 0.87   BUN/Creatinine ratio 14   eGFR >60   Calcium 8.9   Bilirubin, total 0.4   ALT 23   AST 34   Alk.  phosphatase 81   Protein, total 8.3(!)   Albumin 3.9   Globulin 4.4(!)   A-G Ratio 0.9(!) [KG]      ED Course User Index  [KG] Miguel Current, [address]       Procedures

## 2022-11-26 NOTE — DISCHARGE INSTRUCTIONS
Take Naprosyn as prescribed. Recommend close follow-up with your OB/GYN at [facility]. If you develop worsening pain, nausea, vomiting or other concerning symptoms please return to ER.

## 2025-05-12 NOTE — PROGRESS NOTES
HISTORY OF PRESENT ILLNESS  Maeve Jiang is a 39 y.o. female.  HPI    Pt is here to re establish care. Last here 4/12/19      PMHx:    /73 5/25 at     Wt in 2019 was 154 lbs   Wt is 193 lbs today   Discussed diet and weight loss     She saw Dr Mendiola (obgyn) at [facility] for ovarian cyst  Lov 3/23/23     Hx of anxiety  She was given zoloft 50mg 4/29/25, this was started by  but she did not start taking, encouraged her to start zoloft to treat her anxiety  She was having chest tightness with anxiety which led her to go to  this is resolved  She has forms to fill out today for 1-2 days per month off work as needed for anxiety and stress, she has not been there long enough to have FMLA discussed that I may not be able to fill out the paperwork I will review the forms and see if this is something I can help her with    She is also concerned about perimenopause and if this is causing her anxiety symptoms  Discussed obgyn would be who checks and manages hormones      FMHx:  Aunt might have had colon cancer   Mother - asthma, gout, htn  Father - brain cancer, high cholesterol       PSHx:  C section  [place] teeth  Cyst R hand       SHx:  3 kids,    Never smoker no alcohol  She works in customer service       PREVENTIVE:  AAA: no fmhx, not needed  Colonoscopy: not yet needed  Dexa: not yet needed  Mammo: due ordered   Pap: 2022, due reminded   Tdap: 2013, potentially had one in 2021   [id number]: not yet needed  Shingrix: not yet needed  Flu shot: declines    Eye exam: due discussed  HCV: ordered  A1c: ordered  Lipids: ordered  Covid: Declined    Patient Active Problem List   Diagnosis    Chronic pain of both knees     Current Outpatient Medications   Medication Sig Dispense Refill    ALOE VERA PO Take by mouth      ALPHA LIPOIC ACID PO Take by mouth      BIOTIN PO Take by mouth      Cholecalciferol 50 MCG (2000 UT) TABS Take by mouth      ibuprofen (ADVIL;MOTRIN) 800 MG tablet Take by mouth every 8 hours

## 2025-05-15 ENCOUNTER — TELEPHONE (OUTPATIENT)
Age: 40
End: 2025-05-15

## 2025-05-15 NOTE — TELEPHONE ENCOUNTER
Left generic message. Attempted to reschedule 5/19 appointment due to patient not being seen in over 5 years. Patient needs to be reschedule to a new patient appointment on a Friday in May.

## 2025-05-19 ENCOUNTER — OFFICE VISIT (OUTPATIENT)
Age: 40
End: 2025-05-19
Payer: MEDICAID

## 2025-05-19 VITALS
HEART RATE: 68 BPM | SYSTOLIC BLOOD PRESSURE: 106 MMHG | DIASTOLIC BLOOD PRESSURE: 73 MMHG | RESPIRATION RATE: 16 BRPM | BODY MASS INDEX: 36.44 KG/M2 | TEMPERATURE: 97.2 F | OXYGEN SATURATION: 98 % | HEIGHT: 61 IN | WEIGHT: 193 LBS

## 2025-05-19 DIAGNOSIS — Z00.00 PHYSICAL EXAM: Primary | ICD-10-CM

## 2025-05-19 DIAGNOSIS — F41.9 ANXIETY: ICD-10-CM

## 2025-05-19 PROCEDURE — 99386 PREV VISIT NEW AGE 40-64: CPT | Performed by: INTERNAL MEDICINE

## 2025-05-19 SDOH — ECONOMIC STABILITY: FOOD INSECURITY: WITHIN THE PAST 12 MONTHS, YOU WORRIED THAT YOUR FOOD WOULD RUN OUT BEFORE YOU GOT MONEY TO BUY MORE.: SOMETIMES TRUE

## 2025-05-19 SDOH — ECONOMIC STABILITY: FOOD INSECURITY: WITHIN THE PAST 12 MONTHS, THE FOOD YOU BOUGHT JUST DIDN'T LAST AND YOU DIDN'T HAVE MONEY TO GET MORE.: SOMETIMES TRUE

## 2025-05-19 ASSESSMENT — ENCOUNTER SYMPTOMS
BACK PAIN: 0
NAUSEA: 0
VOMITING: 0
SHORTNESS OF BREATH: 0
WHEEZING: 0

## 2025-05-19 ASSESSMENT — PATIENT HEALTH QUESTIONNAIRE - PHQ9
1. LITTLE INTEREST OR PLEASURE IN DOING THINGS: NOT AT ALL
SUM OF ALL RESPONSES TO PHQ QUESTIONS 1-9: 0
SUM OF ALL RESPONSES TO PHQ QUESTIONS 1-9: 0
2. FEELING DOWN, DEPRESSED OR HOPELESS: NOT AT ALL
SUM OF ALL RESPONSES TO PHQ QUESTIONS 1-9: 0
SUM OF ALL RESPONSES TO PHQ QUESTIONS 1-9: 0

## 2025-05-20 ENCOUNTER — RESULTS FOLLOW-UP (OUTPATIENT)
Age: 40
End: 2025-05-20

## 2025-05-20 LAB
ALBUMIN SERPL-MCNC: 3.8 G/DL (ref 3.5–5)
ALBUMIN/GLOB SERPL: 1.1 (ref 1.1–2.2)
ALP SERPL-CCNC: 60 U/L (ref 45–117)
ALT SERPL-CCNC: 20 U/L (ref 12–78)
ANION GAP SERPL CALC-SCNC: 6 MMOL/L (ref 2–12)
AST SERPL-CCNC: 22 U/L (ref 15–37)
BILIRUB SERPL-MCNC: 0.3 MG/DL (ref 0.2–1)
BUN SERPL-MCNC: 11 MG/DL (ref 6–20)
BUN/CREAT SERPL: 13 (ref 12–20)
CALCIUM SERPL-MCNC: 9.1 MG/DL (ref 8.5–10.1)
CHLORIDE SERPL-SCNC: 105 MMOL/L (ref 97–108)
CHOLEST SERPL-MCNC: 183 MG/DL
CO2 SERPL-SCNC: 29 MMOL/L (ref 21–32)
CREAT SERPL-MCNC: 0.85 MG/DL (ref 0.55–1.02)
ERYTHROCYTE [DISTWIDTH] IN BLOOD BY AUTOMATED COUNT: 13.9 % (ref 11.5–14.5)
EST. AVERAGE GLUCOSE BLD GHB EST-MCNC: 105 MG/DL
FSH SERPL-ACNC: 2.7 MIU/ML
GLOBULIN SER CALC-MCNC: 3.6 G/DL (ref 2–4)
GLUCOSE SERPL-MCNC: 89 MG/DL (ref 65–100)
HBA1C MFR BLD: 5.3 % (ref 4–5.6)
HCT VFR BLD AUTO: 36.7 % (ref 35–47)
HCV AB SER IA-ACNC: 0.03 INDEX
HCV AB SERPL QL IA: NONREACTIVE
HDLC SERPL-MCNC: 83 MG/DL
HDLC SERPL: 2.2 (ref 0–5)
HGB BLD-MCNC: 12.2 G/DL (ref 11.5–16)
LDLC SERPL CALC-MCNC: 74.8 MG/DL (ref 0–100)
LH SERPL-ACNC: 1.3 MIU/ML
MCH RBC QN AUTO: 30.7 PG (ref 26–34)
MCHC RBC AUTO-ENTMCNC: 33.2 G/DL (ref 30–36.5)
MCV RBC AUTO: 92.4 FL (ref 80–99)
NRBC # BLD: 0 K/UL (ref 0–0.01)
NRBC BLD-RTO: 0 PER 100 WBC
PLATELET # BLD AUTO: 269 K/UL (ref 150–400)
PMV BLD AUTO: 11.5 FL (ref 8.9–12.9)
POTASSIUM SERPL-SCNC: 3.9 MMOL/L (ref 3.5–5.1)
PROT SERPL-MCNC: 7.4 G/DL (ref 6.4–8.2)
RBC # BLD AUTO: 3.97 M/UL (ref 3.8–5.2)
SODIUM SERPL-SCNC: 140 MMOL/L (ref 136–145)
TRIGL SERPL-MCNC: 126 MG/DL
TSH SERPL DL<=0.05 MIU/L-ACNC: 1.19 UIU/ML (ref 0.36–3.74)
VLDLC SERPL CALC-MCNC: 25.2 MG/DL
WBC # BLD AUTO: 3.7 K/UL (ref 3.6–11)